# Patient Record
Sex: FEMALE | Race: WHITE | Employment: OTHER | URBAN - METROPOLITAN AREA
[De-identification: names, ages, dates, MRNs, and addresses within clinical notes are randomized per-mention and may not be internally consistent; named-entity substitution may affect disease eponyms.]

---

## 2023-11-06 ENCOUNTER — OFFICE VISIT (OUTPATIENT)
Dept: URGENT CARE | Age: 63
End: 2023-11-06

## 2023-11-06 VITALS
WEIGHT: 170 LBS | DIASTOLIC BLOOD PRESSURE: 82 MMHG | OXYGEN SATURATION: 98 % | HEART RATE: 90 BPM | RESPIRATION RATE: 17 BRPM | BODY MASS INDEX: 31.28 KG/M2 | HEIGHT: 62 IN | TEMPERATURE: 98.3 F | SYSTOLIC BLOOD PRESSURE: 142 MMHG

## 2023-11-06 DIAGNOSIS — J06.9 UPPER RESPIRATORY TRACT INFECTION, UNSPECIFIED TYPE: ICD-10-CM

## 2023-11-06 DIAGNOSIS — R09.81 NASAL CONGESTION: Primary | ICD-10-CM

## 2023-11-06 DIAGNOSIS — R05.1 ACUTE COUGH: ICD-10-CM

## 2023-11-06 DIAGNOSIS — J02.9 SORE THROAT: ICD-10-CM

## 2023-11-06 DIAGNOSIS — R03.0 ELEVATED BLOOD PRESSURE READING WITHOUT DIAGNOSIS OF HYPERTENSION: ICD-10-CM

## 2023-11-06 PROBLEM — M50.30 DDD (DEGENERATIVE DISC DISEASE), CERVICAL: Status: ACTIVE | Noted: 2023-11-06

## 2023-11-06 PROBLEM — G43.909 MIGRAINE SYNDROME: Status: ACTIVE | Noted: 2023-11-06

## 2023-11-06 PROBLEM — J30.9 ALLERGIC RHINITIS DUE TO ALLERGEN: Status: ACTIVE | Noted: 2017-05-02

## 2023-11-06 RX ORDER — DEXTROMETHORPHAN HBR AND PYRILAMINE MALEATE 30; 30 MG/1; MG/1
1 TABLET ORAL 4 TIMES DAILY PRN
Qty: 40 TABLET | Refills: 0 | Status: SHIPPED | OUTPATIENT
Start: 2023-11-06

## 2023-11-06 RX ORDER — PREDNISONE 20 MG/1
20 TABLET ORAL 2 TIMES DAILY
Qty: 10 TABLET | Refills: 0 | Status: SHIPPED | OUTPATIENT
Start: 2023-11-06 | End: 2023-11-11

## 2023-11-06 RX ORDER — FLUTICASONE PROPIONATE AND SALMETEROL XINAFOATE 115; 21 UG/1; UG/1
2 AEROSOL, METERED RESPIRATORY (INHALATION) 2 TIMES DAILY
COMMUNITY

## 2023-11-06 RX ORDER — AZELASTINE 1 MG/ML
1 SPRAY, METERED NASAL 2 TIMES DAILY
COMMUNITY

## 2023-11-06 RX ORDER — FUROSEMIDE 20 MG/1
20 TABLET ORAL 2 TIMES DAILY PRN
COMMUNITY
Start: 2017-06-11

## 2023-11-06 ASSESSMENT — ENCOUNTER SYMPTOMS
DIARRHEA: 0
WHEEZING: 0
CHEST TIGHTNESS: 1
RHINORRHEA: 0
VOMITING: 0
COUGH: 1
SORE THROAT: 1
SINUS PRESSURE: 1
SHORTNESS OF BREATH: 0
VOICE CHANGE: 1
ABDOMINAL PAIN: 0
NAUSEA: 0

## 2023-11-06 ASSESSMENT — VISUAL ACUITY: OU: 1

## 2023-11-06 NOTE — PATIENT INSTRUCTIONS
Recommend OTC treatment for symptoms:  ibuprofen (Advil, Motrin) and acetaminophen (Tylenol) for fevers and body aches. antihistamines (Claritin, Zyrtec, Allegra) and nasal steroid sprays (Flonase) to help with nasal congestion and runny nose. throat sprays (Cepacol, chloraseptic) for throat pain. dextromethorphan (Robitussin, Delsym), throat lozenges, or honey (1-2 teaspoons every hour) for cough. warm teas, humidifiers, nasal lavages, and sleeping in an inclined position are also helpful options that can lessen symptoms. Prednisone burst prescribed for throat inflammation  [place] DMT prescribed for cough and congestion relief. Follow up with your PCP or return to the clinic in 5 days if symptoms persist or if symptoms worsen. Discussed pt's elevated BP noted during initial vital signs assessment - pt notes concern for having taken decongestants, and is feeling ill  Discussed continued at-home monitoring of BP and follow up with PCP should elevated BP readings persist at home. If headaches, worsened back pain, abdominal pain, chest pain, shortness of breath, weakness, numbness, or worsened concerns develop, follow up with the ER for further evaluation.     New Prescriptions    DEXTROMETHORPHAN-PYRILAMINE (CAPRON DMT) 30-30 MG TABS    Take 1 tablet by mouth 4 times daily as needed (cough and congestion)    PREDNISONE (DELTASONE) 20 MG TABLET    Take 1 tablet by mouth 2 times daily for 5 days 22

## 2023-11-06 NOTE — PROGRESS NOTES
Heaven Huang (: 1960) is a 61 y.o. female,New patient, here for evaluation of the following chief complaint(s):  Sinusitis, Congestion, and Pharyngitis (For about two weeks she has been ill )      ASSESSMENT/PLAN:    ICD-10-CM    1. Nasal congestion  R09.81 Dextromethorphan-Pyrilamine (CAPRON DMT) 30-30 MG TABS      2. Upper respiratory tract infection, unspecified type  J06.9 predniSONE (DELTASONE) 20 MG tablet     Dextromethorphan-Pyrilamine (CAPRON DMT) 30-30 MG TABS      3. Sore throat  J02.9 predniSONE (DELTASONE) 20 MG tablet      4. Acute cough  R05.1 predniSONE (DELTASONE) 20 MG tablet     Dextromethorphan-Pyrilamine (CAPRON DMT) 30-30 MG TABS      5. Elevated blood pressure reading without diagnosis of hypertension  R03.0           Given breadth of symptoms, exam findings, and lack of tonsillar or purulent findings, concern for persistent viral etiology over bacterial.  Low concern for respiratory distress, pneumonia, bronchitis, and PE. Recommend OTC treatment for symptoms:  ibuprofen (Advil, Motrin) and acetaminophen (Tylenol) for fevers and body aches. antihistamines (Claritin, Zyrtec, Allegra) and nasal steroid sprays (Flonase) to help with nasal congestion and runny nose. throat sprays (Cepacol, chloraseptic) for throat pain. dextromethorphan (Robitussin, Delsym), throat lozenges, or honey (1-2 teaspoons every hour) for cough. warm teas, humidifiers, nasal lavages, and sleeping in an inclined position are also helpful options that can lessen symptoms. Prednisone burst prescribed for throat/pharyngeal inflammation  [place] DMT prescribed for cough and congestion relief. Follow up with your PCP or return to the clinic in 5 days if symptoms persist or if symptoms worsen.     Discussed pt's elevated BP noted during initial vital signs assessment - pt notes concern for having taken decongestants, and is feeling ill  Discussed continued at-home monitoring of BP and follow up with PCP

## 2025-06-28 ENCOUNTER — OFFICE VISIT (OUTPATIENT)
Dept: URGENT CARE | Age: 65
End: 2025-06-28

## 2025-06-28 VITALS
SYSTOLIC BLOOD PRESSURE: 150 MMHG | BODY MASS INDEX: 35.15 KG/M2 | HEIGHT: 62 IN | HEART RATE: 83 BPM | WEIGHT: 191 LBS | DIASTOLIC BLOOD PRESSURE: 90 MMHG | OXYGEN SATURATION: 97 % | TEMPERATURE: 98.7 F

## 2025-06-28 DIAGNOSIS — J01.10 ACUTE NON-RECURRENT FRONTAL SINUSITIS: Primary | ICD-10-CM

## 2025-06-28 DIAGNOSIS — R21 RASH: ICD-10-CM

## 2025-06-28 DIAGNOSIS — R03.0 ELEVATED BP WITHOUT DIAGNOSIS OF HYPERTENSION: ICD-10-CM

## 2025-06-28 DIAGNOSIS — L25.9 CONTACT DERMATITIS, UNSPECIFIED CONTACT DERMATITIS TYPE, UNSPECIFIED TRIGGER: ICD-10-CM

## 2025-06-28 DIAGNOSIS — R09.81 NASAL CONGESTION: ICD-10-CM

## 2025-06-28 RX ORDER — METOPROLOL SUCCINATE 25 MG/1
25 TABLET, EXTENDED RELEASE ORAL DAILY
COMMUNITY
Start: 2025-03-31

## 2025-06-28 RX ORDER — ONDANSETRON 4 MG/1
4 TABLET, ORALLY DISINTEGRATING ORAL 3 TIMES DAILY PRN
Qty: 21 TABLET | Refills: 0 | Status: SHIPPED | OUTPATIENT
Start: 2025-06-28

## 2025-06-28 RX ORDER — PREDNISONE 20 MG/1
20 TABLET ORAL 2 TIMES DAILY
Qty: 10 TABLET | Refills: 0 | Status: SHIPPED | OUTPATIENT
Start: 2025-06-28 | End: 2025-07-03

## 2025-06-28 RX ORDER — M-VIT,TX,IRON,MINS/CALC/FOLIC 27MG-0.4MG
1 TABLET ORAL DAILY
COMMUNITY

## 2025-06-28 RX ORDER — MAGNESIUM 30 MG
30 TABLET ORAL 2 TIMES DAILY
COMMUNITY

## 2025-06-28 ASSESSMENT — ENCOUNTER SYMPTOMS
VOMITING: 0
DIARRHEA: 0
EYE DISCHARGE: 0
NAUSEA: 0
SORE THROAT: 0
COUGH: 0
EYE REDNESS: 0
SHORTNESS OF BREATH: 0
EYE PAIN: 0
ABDOMINAL PAIN: 0

## 2025-06-28 NOTE — PROGRESS NOTES
Maddie Malin (: 1960) is a 65 y.o. female, Established patient, here for evaluation of the following chief complaint(s):  Rash (Patient complains of a rash on both arms (primarily), rash is also on legs + feet, some on neck and \"hairline.\" Believes it is related to doxycycline that she started last Friday. Recently on z-pack that caused hives. Current rash became worse  evening into Monday. ) and Sinusitis (Diagnosed sinus infection, stopped doxycycline due to rash and symptoms have returned.  )      ASSESSMENT/PLAN:    ICD-10-CM    1. Acute non-recurrent frontal sinusitis  J01.10 amoxicillin-clavulanate (AUGMENTIN) 875-125 MG per tablet     ondansetron (ZOFRAN-ODT) 4 MG disintegrating tablet      2. Nasal congestion  R09.81       3. Contact dermatitis, unspecified contact dermatitis type, unspecified trigger  L25.9 predniSONE (DELTASONE) 20 MG tablet      4. Rash  R21 predniSONE (DELTASONE) 20 MG tablet      5. Elevated BP without diagnosis of hypertension  R03.0 Non BS - External Referral To Primary Care          - Discussed with patient that symptoms and physical exam findings are most consistent with acute frontal sinusitis and contact dermatitis from taking antibiotics. Will treat with Augmentin for sinusitis and will prescribe Zofran due to Augmentin usually makes her nauseous but it would be the best option due to patients allergies to medications. Will also treat with prednisone twice a day for 5 days for inflammation from rash. Pt did not want  any testing done. Low concern for anaphylactic shock, cellulitis, strep pharyngitis, otitis media, bacterial pneumonia, bronchitis or sepsis.  - Pt to drink lots of fluids  - Pt to take medication as prescribed  - Pt ok to take tylenol as needed  - Pt to call if any symptoms worsen or follow up with PCP if not better in 7 days  - Pt to go to ER if have shortness of breath, chest pain, sudden fever, trouble swallowing or trouble